# Patient Record
Sex: MALE | Employment: FULL TIME | URBAN - METROPOLITAN AREA
[De-identification: names, ages, dates, MRNs, and addresses within clinical notes are randomized per-mention and may not be internally consistent; named-entity substitution may affect disease eponyms.]

---

## 2023-05-12 ENCOUNTER — TRANSCRIBE ORDERS (OUTPATIENT)
Dept: OCCUPATIONAL MEDICINE | Age: 28
End: 2023-05-12

## 2023-05-12 ENCOUNTER — HOSPITAL ENCOUNTER (OUTPATIENT)
Dept: GENERAL RADIOLOGY | Age: 28
Discharge: HOME | End: 2023-05-15

## 2023-05-12 DIAGNOSIS — T14.90XA INJURY: ICD-10-CM

## 2023-05-12 DIAGNOSIS — T14.90XA INJURY: Primary | ICD-10-CM

## 2023-05-12 PROCEDURE — 73610 X-RAY EXAM OF ANKLE: CPT

## 2023-08-24 ENCOUNTER — TELEPHONE (OUTPATIENT)
Dept: ORTHOPEDIC SURGERY | Age: 28
End: 2023-08-24

## 2023-08-24 NOTE — TELEPHONE ENCOUNTER
He has an appt Sept 1. He had a MRI at [address] [facility] and they were supposed to send the MRI over. He wants to know if you have these pictures. Please let him know.

## 2023-09-01 ENCOUNTER — OFFICE VISIT (OUTPATIENT)
Dept: ORTHOPEDIC SURGERY | Age: 28
End: 2023-09-01

## 2023-09-01 DIAGNOSIS — S99.912A INJURY OF LEFT ANKLE, INITIAL ENCOUNTER: Primary | ICD-10-CM

## 2023-09-01 DIAGNOSIS — M25.572 PAIN IN LEFT ANKLE AND JOINTS OF LEFT FOOT: ICD-10-CM

## 2023-09-01 RX ORDER — DICLOFENAC SODIUM 75 MG/1
75 TABLET, DELAYED RELEASE ORAL 2 TIMES DAILY PRN
COMMUNITY
Start: 2023-05-10 | End: 2024-05-09

## 2023-09-01 RX ORDER — ESCITALOPRAM OXALATE 10 MG/1
TABLET ORAL
COMMUNITY
Start: 2023-07-02

## 2023-09-01 RX ORDER — FLUTICASONE PROPIONATE 50 MCG
1 SPRAY, SUSPENSION (ML) NASAL DAILY
COMMUNITY
Start: 2019-05-18

## 2023-09-01 RX ORDER — ALBUTEROL SULFATE 90 UG/1
2 AEROSOL, METERED RESPIRATORY (INHALATION) EVERY 6 HOURS PRN
COMMUNITY
Start: 2023-01-05 | End: 2024-01-05

## 2023-09-01 RX ORDER — CITALOPRAM HYDROBROMIDE 10 MG/1
TABLET ORAL
COMMUNITY
Start: 2023-08-30

## 2023-09-01 NOTE — PROGRESS NOTES
Name: Shawn Villarreal  YOB: 1995  Gender: male  MRN: [id number]     CC: Left ankle pain    HPI:   05/09/2023: Injury involving a manhole  09/01/2023: Initial visit with me: Left outer ankle/hindfoot pain    ROS/Meds/PSH/PMH/FH/SH: reviewed today    Tobacco:  reports that he has never smoked. He has never used smokeless tobacco.     Physical Examination:  Patient appears to be alert and oriented with acceptable appearance. No obvious distress or SOB  CV: appears to have acceptable vascular color and capillary refill  Neuro: appears to have mostly intact light touch sensation   Skin: No Achilles thickening; left lateral ankle/hindfoot thickening  MS: Standing: Plantigrade: Gait full  Left = mild insertional Achilles pain; no gap; no elliptical thickening; no deficiency  Left = anterior lateral ankle to dorsal lateral hindfoot pain centers mainly at the ACP  Left = mild lateral ankle instability but good endpoint  Left = full ankle/foot motion: 5/5 strength; no instability or crepitance    XR: Left: Standing AP lateral mortise ankle plus AP oblique foot taken today with no identifiable fracture but question changes anterior calcaneal process; no OCD appreciated; mortise and syndesmosis intact; insertional Achilles calcification  XR Impression:  As above      Reviewed Test/Records/Documents:   01/05/2023: [facility] family medicine: Diagnosis: Achilles tendinitis  05/10/2023: [facility] family medicine: Diagnosis again Achilles tendinitis. Prescribed diclofenac  [facility]/12/20[facility]: [facility]: Radiology impression: No acute findings  05/12/2023: Occupational health: History of falling into a manhole 05/09/2023 05/23/2023: Left foot MRI scan without contrast [facility]: Radiology impressions:  1. No evidence of lateral foot fractures  2. Reactive edema subjacent to the touchdown of the Achilles tendon within the posterior calcaneus.   Intrinsic signal of the Achilles tendon appears

## 2023-09-12 ENCOUNTER — TELEPHONE (OUTPATIENT)
Dept: ORTHOPEDIC SURGERY | Age: 28
End: 2023-09-12

## 2023-09-12 ENCOUNTER — CLINICAL DOCUMENTATION (OUTPATIENT)
Dept: ORTHOPEDIC SURGERY | Age: 28
End: 2023-09-12

## 2023-09-12 NOTE — TELEPHONE ENCOUNTER
Please  fax  the  MRI  order  to [facility]   w/ [facility]  at [phone]

## 2023-09-12 NOTE — PROGRESS NOTES
Faxed pt's MRI order to [facility] with [employer] at fax #[phone]. Received a complete.

## 2023-09-13 ENCOUNTER — CLINICAL DOCUMENTATION (OUTPATIENT)
Dept: ORTHOPEDIC SURGERY | Age: 28
End: 2023-09-13

## 2023-09-13 NOTE — PROGRESS NOTES
MRI WO contrast of the left ankle scheduled or Wed., 9.13.23 at 8:30 AM at [facility], phone [phone]

## 2023-09-15 DIAGNOSIS — M25.572 PAIN IN LEFT ANKLE AND JOINTS OF LEFT FOOT: ICD-10-CM

## 2023-09-15 DIAGNOSIS — S99.912A INJURY OF LEFT ANKLE, INITIAL ENCOUNTER: ICD-10-CM

## 2023-09-18 ENCOUNTER — OFFICE VISIT (OUTPATIENT)
Dept: ORTHOPEDIC SURGERY | Age: 28
End: 2023-09-18
Payer: COMMERCIAL

## 2023-09-18 DIAGNOSIS — M25.572 PAIN IN LEFT ANKLE AND JOINTS OF LEFT FOOT: ICD-10-CM

## 2023-09-18 DIAGNOSIS — S99.912A INJURY OF LEFT ANKLE, INITIAL ENCOUNTER: Primary | ICD-10-CM

## 2023-09-18 PROCEDURE — 99213 OFFICE O/P EST LOW 20 MIN: CPT | Performed by: ORTHOPAEDIC SURGERY

## 2023-09-18 RX ORDER — OXYCODONE HYDROCHLORIDE 5 MG/1
TABLET ORAL
COMMUNITY
Start: 2023-09-06

## 2023-09-18 NOTE — PROGRESS NOTES
Name: Sharla Vaz  YOB: 1995  Gender: male  MRN: [id number]     09/18/2023: Here to discuss MRI scan: 09/14/2023 Dr. Ashtyn Ho: Elbow wrist and hand surgery    HPI:   05/09/2023: Injury involving a manhole  09/01/2023: Initial visit with me: Left outer ankle/hindfoot pain    ROS/Meds/PSH/PMH/FH/SH: reviewed today    Tobacco:  reports that he has never smoked. He has never used smokeless tobacco.     Physical Examination:  Patient appears to be alert and oriented with acceptable appearance. No obvious distress or SOB  CV: appears to have acceptable vascular color and capillary refill  Neuro: appears to have mostly intact light touch sensation   Skin: No Achilles thickening; left lateral ankle/hindfoot thickening  MS: Standing: Plantigrade: Gait full  Left = mainly posterior heel area pain  Left = full ankle/foot motion: 5/5 strength; no instability or crepitance    XR: Left: Standing AP lateral mortise ankle plus AP oblique foot taken 09/01/2023 with no identifiable fracture but question changes anterior calcaneal process; no OCD appreciated; mortise and syndesmosis intact; insertional Achilles calcification  XR Impression:  As above      Reviewed Test/Records/Documents:   01/05/2023: [facility] family medicine: Diagnosis: Achilles tendinitis  05/10/2023: [facility] family medicine: Diagnosis again Achilles tendinitis. Prescribed diclofenac  05/12/2023: [facility] x-ray left ankle: Radiology impression: No acute findings  05/12/2023: Occupational health: History of falling into a manhole 05/09/2023 05/23/2023: Left foot MRI scan without contrast [facility] radiology: Radiology impressions:  1. No evidence of lateral foot fractures  2. Reactive edema subjacent to the touchdown of the Achilles tendon within the posterior calcaneus. Intrinsic signal of the Achilles tendon appears intact  3.   Heel pad fibrosis and edema  05/25/2023: [facility] occupational health: Reflects desire for physical

## 2023-10-02 ENCOUNTER — TELEPHONE (OUTPATIENT)
Dept: ORTHOPEDIC SURGERY | Age: 28
End: 2023-10-02

## 2023-10-02 NOTE — TELEPHONE ENCOUNTER
He has a question regarding the timing of follow up his appointment and his PT that just started due to a delay by work comp

## 2023-10-02 NOTE — TELEPHONE ENCOUNTER
Called and spoke with pt. Pt appt was move out from 10/16/23 to 10/30/23 @ 240 at the [facility]. Pt voiced understanding.

## 2023-10-30 ENCOUNTER — OFFICE VISIT (OUTPATIENT)
Dept: ORTHOPEDIC SURGERY | Age: 28
End: 2023-10-30
Payer: COMMERCIAL

## 2023-10-30 DIAGNOSIS — S99.912A INJURY OF LEFT ANKLE, INITIAL ENCOUNTER: Primary | ICD-10-CM

## 2023-10-30 DIAGNOSIS — M25.572 PAIN IN LEFT ANKLE AND JOINTS OF LEFT FOOT: ICD-10-CM

## 2023-10-30 PROCEDURE — 99213 OFFICE O/P EST LOW 20 MIN: CPT | Performed by: ORTHOPAEDIC SURGERY

## 2023-10-30 RX ORDER — GABAPENTIN 300 MG/1
300 CAPSULE ORAL 3 TIMES DAILY
Qty: 90 CAPSULE | Refills: 0 | COMMUNITY
Start: 2023-10-02 | End: 2023-11-01

## 2023-10-30 RX ORDER — MELOXICAM 15 MG/1
15 TABLET ORAL DAILY
Qty: 30 TABLET | Refills: 2 | COMMUNITY
Start: 2023-10-23 | End: 2024-01-21

## 2023-10-30 NOTE — PROGRESS NOTES
Name: Angel Ty  YOB: 1995  Gender: male  MRN: [id number]     09/14/2023 Dr. Grant Fat: Elbow wrist and hand surgery  09/18/2023: Presented to discuss MRI scan:   10/30/2023: Better but he still feels unstable in balance    HPI:   05/09/2023: Injury involving a manhole  09/01/2023: Initial visit with me: Left outer ankle/hindfoot pain    ROS/Meds/PSH/PMH/FH/SH: reviewed today    Tobacco:  reports that he has never smoked. He has never used smokeless tobacco.     Physical Examination:  Patient appears to be alert and oriented with acceptable appearance. No obvious distress or SOB  CV: appears to have acceptable vascular color and capillary refill  Neuro: appears to have mostly intact light touch sensation   Skin: No Achilles thickening; left lateral ankle/hindfoot thickening  MS: Standing: Plantigrade: Gait full  Left = full ankle/foot motion: 5/5 strength; no instability or crepitance    XR: Left: Standing AP lateral mortise ankle plus AP oblique foot taken 09/01/2023 with no identifiable fracture but question changes anterior calcaneal process; no OCD appreciated; mortise and syndesmosis intact; insertional Achilles calcification  XR Impression:  As above      Reviewed Test/Records/Documents:   01/05/2023: [facility] family medicine: Diagnosis: Achilles tendinitis  05/10/2023: [facility] family medicine: Diagnosis again Achilles tendinitis. Prescribed diclofenac  05/12/2023: [facility] x-ray left ankle: Radiology impression: No acute findings  05/12/2023: Occupational health: History of falling into a manhole 05/09/2023 05/23/2023: Left foot MRI scan without contrast [facility] radiology: Radiology impressions:  1. No evidence of lateral foot fractures  2. Reactive edema subjacent to the touchdown of the Achilles tendon within the posterior calcaneus. Intrinsic signal of the Achilles tendon appears intact  3.   Heel pad fibrosis and edema    05/25/2023: [facility] occupational health:

## 2023-11-29 ENCOUNTER — OFFICE VISIT (OUTPATIENT)
Dept: ORTHOPEDIC SURGERY | Age: 28
End: 2023-11-29
Payer: COMMERCIAL

## 2023-11-29 VITALS — HEIGHT: 70 IN | BODY MASS INDEX: 32.21 KG/M2 | WEIGHT: 225 LBS

## 2023-11-29 DIAGNOSIS — S99.912S INJURY OF LEFT ANKLE, SEQUELA: Primary | ICD-10-CM

## 2023-11-29 PROCEDURE — 99213 OFFICE O/P EST LOW 20 MIN: CPT | Performed by: ORTHOPAEDIC SURGERY

## 2023-11-29 NOTE — PROGRESS NOTES
Name: Silvio Corral  YOB: 1995  Gender: male  MRN: [id number]     09/14/2023 Dr. Murray Dasilva: Elbow wrist and hand surgery  09/18/2023: Presented to discuss MRI scan:   10/30/2023: Better but he reported feeling unstable in balance  11/29/2023: He completed PT and feels he can be released to regular work related to his foot and ankle. HPI:   05/09/2023: Injury involving a manhole  09/01/2023: Initial visit with me: Left outer ankle/hindfoot pain    ROS/Meds/PSH/PMH/FH/SH: reviewed today    Tobacco:  reports that he has never smoked. He has never used smokeless tobacco.     Physical Examination:  Patient appears to be alert and oriented with acceptable appearance. No obvious distress or SOB  CV: appears to have acceptable vascular color and capillary refill  Neuro: appears to have mostly intact light touch sensation   Skin: No Achilles thickening; left lateral ankle/hindfoot thickening  MS: Standing: Plantigrade: Gait full  Left = no ankle or foot pain; full ankle/foot motion: 5/5 strength; no instability or crepitance    XR: Left: Standing AP lateral mortise ankle plus AP oblique foot taken 09/01/2023 with no identifiable fracture but question changes anterior calcaneal process; no OCD appreciated; mortise and syndesmosis intact; insertional Achilles calcification  XR Impression:  As above      Reviewed Test/Records/Documents:   01/05/2023: [facility] medicine: Diagnosis: Achilles tendinitis  05/10/2023: [facility] family medicine: Diagnosis again Achilles tendinitis. Prescribed diclofenac  05/12/2023: [facility] x-ray left ankle: Radiology impression: No acute findings  05/12/2023: Occupational health: History of falling into a manhole 05/09/2023 05/23/2023: Left foot MRI scan without contrast [facility]: Radiology impressions:  1. No evidence of lateral foot fractures  2. Reactive edema subjacent to the touchdown of the Achilles tendon within the posterior calcaneus.